# Patient Record
Sex: FEMALE | Race: OTHER | Employment: UNEMPLOYED | URBAN - NONMETROPOLITAN AREA
[De-identification: names, ages, dates, MRNs, and addresses within clinical notes are randomized per-mention and may not be internally consistent; named-entity substitution may affect disease eponyms.]

---

## 2017-03-06 ENCOUNTER — TELEPHONE (OUTPATIENT)
Dept: FAMILY MEDICINE CLINIC | Age: 52
End: 2017-03-06

## 2017-03-06 DIAGNOSIS — R42 VERTIGO: Primary | ICD-10-CM

## 2017-03-28 ENCOUNTER — TELEPHONE (OUTPATIENT)
Dept: FAMILY MEDICINE CLINIC | Age: 52
End: 2017-03-28

## 2017-03-30 ENCOUNTER — OFFICE VISIT (OUTPATIENT)
Dept: FAMILY MEDICINE CLINIC | Age: 52
End: 2017-03-30

## 2017-03-30 VITALS
HEART RATE: 81 BPM | TEMPERATURE: 97.7 F | SYSTOLIC BLOOD PRESSURE: 105 MMHG | WEIGHT: 112 LBS | DIASTOLIC BLOOD PRESSURE: 72 MMHG | BODY MASS INDEX: 22.58 KG/M2 | RESPIRATION RATE: 12 BRPM | HEIGHT: 59 IN

## 2017-03-30 DIAGNOSIS — N95.1 PERIMENOPAUSAL: ICD-10-CM

## 2017-03-30 DIAGNOSIS — Z00.00 WELL ADULT EXAM: ICD-10-CM

## 2017-03-30 DIAGNOSIS — Z12.31 SCREENING MAMMOGRAM, ENCOUNTER FOR: ICD-10-CM

## 2017-03-30 DIAGNOSIS — R79.89 LOW VITAMIN D LEVEL: ICD-10-CM

## 2017-03-30 DIAGNOSIS — R42 VERTIGO: ICD-10-CM

## 2017-03-30 DIAGNOSIS — E16.2 HYPOGLYCEMIA: Primary | ICD-10-CM

## 2017-03-30 DIAGNOSIS — N92.1 MENORRHAGIA WITH IRREGULAR CYCLE: ICD-10-CM

## 2017-03-30 LAB
ALBUMIN SERPL-MCNC: 3.9 GM/DL (ref 3.5–5.1)
ALP BLD-CCNC: 53 U/L (ref 38–126)
ALT SERPL-CCNC: 11 U/L (ref 11–66)
ANION GAP SERPL CALCULATED.3IONS-SCNC: 14 MEQ/L (ref 8–16)
ANISOCYTOSIS: ABNORMAL
AST SERPL-CCNC: 16 U/L (ref 5–40)
BASOPHILS # BLD: 2 %
BASOPHILS ABSOLUTE: 0.1 THOU/MM3 (ref 0–0.1)
BILIRUB SERPL-MCNC: 0.3 MG/DL (ref 0.3–1.2)
BILIRUBIN DIRECT: < 0.2 MG/DL (ref 0–0.3)
BUN BLDV-MCNC: 8 MG/DL (ref 7–22)
CALCIUM SERPL-MCNC: 8.7 MG/DL (ref 8.5–10.5)
CHLORIDE BLD-SCNC: 102 MEQ/L (ref 98–111)
CHOLESTEROL, TOTAL: 229 MG/DL (ref 100–199)
CO2: 25 MEQ/L (ref 23–33)
CREAT SERPL-MCNC: 0.8 MG/DL (ref 0.4–1.2)
EOSINOPHIL # BLD: 2.6 %
EOSINOPHILS ABSOLUTE: 0.1 THOU/MM3 (ref 0–0.4)
GFR SERPL CREATININE-BSD FRML MDRD: 76 ML/MIN/1.73M2
GLUCOSE BLD-MCNC: 86 MG/DL (ref 70–108)
HBA1C MFR BLD: 5.6 %
HCT VFR BLD CALC: 36.6 % (ref 37–47)
HDLC SERPL-MCNC: 60 MG/DL
HEMOGLOBIN: 12 GM/DL (ref 12–16)
LDL CHOLESTEROL CALCULATED: 142 MG/DL
LYMPHOCYTES # BLD: 30.1 %
LYMPHOCYTES ABSOLUTE: 1.5 THOU/MM3 (ref 1–4.8)
MAGNESIUM: 2.3 MG/DL (ref 1.6–2.4)
MCH RBC QN AUTO: 27 PG (ref 27–31)
MCHC RBC AUTO-ENTMCNC: 32.7 GM/DL (ref 33–37)
MCV RBC AUTO: 82.5 FL (ref 81–99)
MONOCYTES # BLD: 7 %
MONOCYTES ABSOLUTE: 0.4 THOU/MM3 (ref 0.4–1.3)
NUCLEATED RED BLOOD CELLS: 0 /100 WBC
PDW BLD-RTO: 16.4 % (ref 11.5–14.5)
PLATELET # BLD: 255 THOU/MM3 (ref 130–400)
PMV BLD AUTO: 8.2 MCM (ref 7.4–10.4)
POTASSIUM SERPL-SCNC: 3.3 MEQ/L (ref 3.5–5.2)
PROGESTERONE LEVEL: < 0.05 NG/ML
RBC # BLD: 4.44 MILL/MM3 (ref 4.2–5.4)
RBC # BLD: NORMAL 10*6/UL
SEG NEUTROPHILS: 58.3 %
SEGMENTED NEUTROPHILS ABSOLUTE COUNT: 2.9 THOU/MM3 (ref 1.8–7.7)
SODIUM BLD-SCNC: 141 MEQ/L (ref 135–145)
TOTAL PROTEIN: 7 GM/DL (ref 6.1–8)
TRIGL SERPL-MCNC: 135 MG/DL (ref 0–199)
TSH SERPL DL<=0.05 MIU/L-ACNC: 2.45 MCIU/ML (ref 0.4–4.2)
VITAMIN D 25-HYDROXY: 28 NG/ML (ref 30–100)
WBC # BLD: 5 THOU/MM3 (ref 4.8–10.8)

## 2017-03-30 PROCEDURE — 83036 HEMOGLOBIN GLYCOSYLATED A1C: CPT | Performed by: FAMILY MEDICINE

## 2017-03-30 PROCEDURE — 99214 OFFICE O/P EST MOD 30 MIN: CPT | Performed by: FAMILY MEDICINE

## 2017-03-30 ASSESSMENT — ENCOUNTER SYMPTOMS
DIARRHEA: 0
VOMITING: 0
BLOOD IN STOOL: 0
NAUSEA: 0
EYE PAIN: 0
COUGH: 0
SHORTNESS OF BREATH: 0
ABDOMINAL PAIN: 0
CONSTIPATION: 0
TROUBLE SWALLOWING: 0

## 2017-03-31 ENCOUNTER — TELEPHONE (OUTPATIENT)
Dept: FAMILY MEDICINE CLINIC | Age: 52
End: 2017-03-31

## 2017-03-31 LAB — THYROXINE (T4): 6.4 UG/DL (ref 4.5–12)

## 2017-04-01 LAB — DHEAS (DHEA SULFATE): 138 UG/DL (ref 32–240)

## 2017-04-02 LAB
ANA SCREEN: DETECTED
DHEA UNCONJUGATED: 1.61 NG/ML (ref 0.63–4.7)
TESTOSTERONE, FREE W SHGB, FEMALES/CHILDREN: NORMAL

## 2017-04-03 ENCOUNTER — TELEPHONE (OUTPATIENT)
Dept: FAMILY MEDICINE CLINIC | Age: 52
End: 2017-04-03

## 2017-04-03 LAB — ESTROGENS, FRACTIONATED, SERUM: NORMAL

## 2017-04-05 LAB — ANA SCREEN, REFLEXED: ABNORMAL

## 2017-04-07 ENCOUNTER — TELEPHONE (OUTPATIENT)
Dept: FAMILY MEDICINE CLINIC | Age: 52
End: 2017-04-07

## 2017-04-07 DIAGNOSIS — R42 VERTIGO: Primary | ICD-10-CM

## 2017-04-07 DIAGNOSIS — E16.2 HYPOGLYCEMIA: ICD-10-CM

## 2017-04-07 DIAGNOSIS — R76.8 POSITIVE ANA (ANTINUCLEAR ANTIBODY): ICD-10-CM

## 2017-04-07 DIAGNOSIS — R73.02 GLUCOSE INTOLERANCE (IMPAIRED GLUCOSE TOLERANCE): ICD-10-CM

## 2017-04-12 ENCOUNTER — TELEPHONE (OUTPATIENT)
Dept: FAMILY MEDICINE CLINIC | Age: 52
End: 2017-04-12

## 2017-06-22 ENCOUNTER — TELEPHONE (OUTPATIENT)
Dept: FAMILY MEDICINE CLINIC | Age: 52
End: 2017-06-22

## 2020-12-03 ENCOUNTER — TELEPHONE (OUTPATIENT)
Dept: FAMILY MEDICINE CLINIC | Age: 55
End: 2020-12-03

## 2020-12-03 NOTE — TELEPHONE ENCOUNTER
FYI:    Patient was last seen 08/15/2016 by Dr. Sirena Waite. She is scheduled for a new patient visit with Michelle Petersen on 12/17/2020. She was advised that she would be considered a new patient since it has been more than 3 years since she was last seen. She said that was fine and she doesn't have a preference of providers, whoever could see her soonest. However, she didn't want a physical/wellness exam, she just wanted to be seen for the issues she is having of wax build up and vertigo issues that she says she has been having for awhile now.

## 2020-12-09 ENCOUNTER — TELEPHONE (OUTPATIENT)
Dept: FAMILY MEDICINE CLINIC | Age: 55
End: 2020-12-09

## 2020-12-09 NOTE — TELEPHONE ENCOUNTER
1. Appt time and date. (give directions)      1320 12/17/20  2. Arrive 15 min before appt. 3. Please bring all medications to appt. 4. Bring immunization record. (if no record, which immunizations have you had and where?)    LM for patient to return call at their earliest convenience.

## 2020-12-17 ENCOUNTER — OFFICE VISIT (OUTPATIENT)
Dept: FAMILY MEDICINE CLINIC | Age: 55
End: 2020-12-17
Payer: COMMERCIAL

## 2020-12-17 VITALS
DIASTOLIC BLOOD PRESSURE: 76 MMHG | HEART RATE: 80 BPM | RESPIRATION RATE: 14 BRPM | BODY MASS INDEX: 23.18 KG/M2 | TEMPERATURE: 98.5 F | SYSTOLIC BLOOD PRESSURE: 108 MMHG | HEIGHT: 59 IN | WEIGHT: 115 LBS | OXYGEN SATURATION: 98 %

## 2020-12-17 PROBLEM — H90.41 SENSORINEURAL HEARING LOSS (SNHL) OF RIGHT EAR WITH UNRESTRICTED HEARING OF LEFT EAR: Status: ACTIVE | Noted: 2020-12-17

## 2020-12-17 PROBLEM — H81.10 VERTIGO, BENIGN PAROXYSMAL, UNSPECIFIED LATERALITY: Status: ACTIVE | Noted: 2020-12-17

## 2020-12-17 PROCEDURE — 99203 OFFICE O/P NEW LOW 30 MIN: CPT | Performed by: NURSE PRACTITIONER

## 2020-12-17 SDOH — ECONOMIC STABILITY: FOOD INSECURITY: WITHIN THE PAST 12 MONTHS, THE FOOD YOU BOUGHT JUST DIDN'T LAST AND YOU DIDN'T HAVE MONEY TO GET MORE.: NEVER TRUE

## 2020-12-17 SDOH — ECONOMIC STABILITY: TRANSPORTATION INSECURITY
IN THE PAST 12 MONTHS, HAS THE LACK OF TRANSPORTATION KEPT YOU FROM MEDICAL APPOINTMENTS OR FROM GETTING MEDICATIONS?: NO

## 2020-12-17 SDOH — ECONOMIC STABILITY: TRANSPORTATION INSECURITY
IN THE PAST 12 MONTHS, HAS LACK OF TRANSPORTATION KEPT YOU FROM MEETINGS, WORK, OR FROM GETTING THINGS NEEDED FOR DAILY LIVING?: NO

## 2020-12-17 SDOH — ECONOMIC STABILITY: FOOD INSECURITY: WITHIN THE PAST 12 MONTHS, YOU WORRIED THAT YOUR FOOD WOULD RUN OUT BEFORE YOU GOT MONEY TO BUY MORE.: NEVER TRUE

## 2020-12-17 SDOH — ECONOMIC STABILITY: INCOME INSECURITY: HOW HARD IS IT FOR YOU TO PAY FOR THE VERY BASICS LIKE FOOD, HOUSING, MEDICAL CARE, AND HEATING?: NOT HARD AT ALL

## 2020-12-17 ASSESSMENT — PATIENT HEALTH QUESTIONNAIRE - PHQ9
SUM OF ALL RESPONSES TO PHQ QUESTIONS 1-9: 0
SUM OF ALL RESPONSES TO PHQ QUESTIONS 1-9: 0
2. FEELING DOWN, DEPRESSED OR HOPELESS: 0
SUM OF ALL RESPONSES TO PHQ9 QUESTIONS 1 & 2: 0
SUM OF ALL RESPONSES TO PHQ QUESTIONS 1-9: 0
1. LITTLE INTEREST OR PLEASURE IN DOING THINGS: 0

## 2020-12-17 NOTE — PROGRESS NOTES
year hx, current smoker or quit within past 15 years) - n/a    Tetanus - needs  Influenza Vaccine - yearly  Pneumonia Vaccine - 65  Zostavax - 50   HPV Vaccine - n/a    Breast Cancer Screening - 50  Cervical Cancer Screening - needs  Osteoporosis Screening - 67  Chlamydia Screen - n/a    AAA Screening - n/a    Falls screening - n/a    No current outpatient medications on file. No current facility-administered medications for this visit. No orders of the defined types were placed in this encounter. All medications reviewed and reconciled, including OTC and herbal medications. Updated list given to patient.        Patient Active Problem List   Diagnosis    Vertigo    Hypoglycemia    Positive REHANA (antinuclear antibody)    Glucose intolerance (impaired glucose tolerance)       Past Medical History:   Diagnosis Date    Allergic rhinitis     seasonal    Labyrinthitis        Past Surgical History:   Procedure Laterality Date     SECTION      x's 2    TUBAL LIGATION         Allergies   Allergen Reactions    Sulfa Antibiotics Nausea And Vomiting       Social History     Socioeconomic History    Marital status:      Spouse name: Not on file    Number of children: Not on file    Years of education: Not on file    Highest education level: Not on file   Occupational History    Not on file   Social Needs    Financial resource strain: Not hard at all   [employer] insecurity     Worry: Never true     Inability: Never true   [employer] needs     Medical: No     Non-medical: No   Tobacco Use    Smoking status: Never Smoker    Smokeless tobacco: Never Used   Substance and Sexual Activity    Alcohol use: No    Drug use: No    Sexual activity: Not on file   Lifestyle    Physical activity     Days per week: Not on file     Minutes per session: Not on file    Stress: Not on file   Relationships    Social connections     Talks on phone: Not on file     Gets together: Not on file Attends [employer]ism service: Not on file     Active member of club or organization: Not on file     Attends meetings of clubs or organizations: Not on file     Relationship status: Not on file    Intimate partner violence     Fear of current or ex partner: Not on file     Emotionally abused: Not on file     Physically abused: Not on file     Forced sexual activity: Not on file   Other Topics Concern    Not on file   Social History Narrative    Not on file       Family History   Problem Relation Age of Onset    Other Mother         gastrointestinal    High Blood Pressure Father     High Cholesterol Father     Allergies Brother         food    Stroke Maternal Grandfather     Colon Cancer Neg Hx     Breast Cancer Neg Hx          I have reviewed the patient's past medical history, past surgical history, allergies, medications, social and family history and I have made updates where appropriate.       Review of Systems  Positive responses are highlighted in bold    Constitutional:  Fever, Chills, Night Sweats, Fatigue, Unexpected changes in weight  Eyes:  Eye discharge, Eye pain, Eye redness, Visual disturbances   HENT:  Ear pain, Tinnitus, Nosebleeds, Trouble swallowing, Hearing loss, Sore throat  Cardiovascular:  Chest Pain, Palpitations, Orthopnea, Paroxysmal Nocturnal Dyspnea  Respiratory:  Cough, Wheezing, Shortness of breath, Chest tightness, Apnea  Gastrointestinal:  Nausea, Vomiting, Diarrhea, Constipation, Heartburn, Blood in stool  Genitourinary:  Difficulty or painful urination, Flank pain, Change in frequency, Urgency  Skin:  Color change, Rash, Itching, Wound  Psychiatric:  Hallucinations, Anxiety, Depression, Suicidal ideation  Hematological:  Enlarged glands, Easy bleeding, Easily bruising  Musculoskeletal:  Joint pain, Back pain, Gait problems, Joint swelling, Myalgias  Neurological:  Dizziness, Headaches, Presyncope, Numbness, Seizures, Tremors  Allergy:  Environmental allergies, Food allergies  Endocrine:  Heat Intolerance, Cold Intolerance, Polydipsia, Polyphagia, Polyuria    Lab Results   Component Value Date     03/30/2017    K 3.3 (L) 03/30/2017     03/30/2017    CO2 25 03/30/2017    BUN 8 03/30/2017    CREATININE 0.8 03/30/2017    GLUCOSE 86 03/30/2017    CALCIUM 8.7 03/30/2017    PROT 7.0 03/30/2017    LABALBU 3.9 03/30/2017    BILITOT 0.3 03/30/2017    ALKPHOS 53 03/30/2017    AST 16 03/30/2017    ALT 11 03/30/2017    LABGLOM 76 (A) 03/30/2017     Lab Results   Component Value Date    WBC 5.0 03/30/2017    HGB 12.0 03/30/2017    HCT 36.6 (L) 03/30/2017    MCV 82.5 03/30/2017     03/30/2017     Lab Results   Component Value Date    TSH 2.450 03/30/2017       PHYSICAL EXAM:  Vitals:    12/17/20 1323   BP: 108/76   Pulse: 80   Resp: 14   Temp: 98.5 °F (36.9 °C)   SpO2: 98%   Weight: 115 lb (52.2 kg)   Height: 4' 11\" (1.499 m)     Body mass index is 23.23 kg/m². VS Reviewed  General Appearance: A&O x 3, No acute distress,well developed and well- nourished  Head: normocephalic and atraumatic  Eyes: pupils equal, round, and reactive to light, extraocular eye movements intact, conjunctivae and eye lids without erythema  ENT: external ear and ear canal clear bilaterally, TMs intact and regular, nose without deformity, nasal mucosa and turbinates normal without polyps, oropharynx normal, dentition is normal for age  Neck: supple and non-tender without mass, no thyromegaly or thyroid nodules, no cervical lymphadenopathy  Pulmonary/Chest: clear to auscultation bilaterally- no wheezes, rales or rhonchi, normal air movement, no respiratory distress or retractions  Cardiovascular: S1 and S2 auscultated w/ RRR. No murmurs, rubs, clicks, or gallops, distal pulses intact. Abdomen: soft, non-tender, non-distended, bowl sounds physiologic,  no rebound or guarding, no masses or hernias noted. Liver and spleen without enlargement.    Extremities: no cyanosis, clubbing or edema of the lower extremities. +2 PT/DP bilaterally. Musculoskeletal: No joint swelling or gross deformity   Neuro:  Alert, 5/5 strength globally and symmetrically, negative [place] hallpike bilaterally, chronic nystagmus  Psych: Affect appropriate. Mood normal. Thought process is normal without evidence of depression or psychosis. Good insight and appropriate interaction. Cognition and memory appear to be intact. Skin: warm and dry, no rash or erythema  Lymph:  No cervical, auricular or supraclavicular lymph nodes palpated    ASSESSMENT & PLAN  Radha was seen today for dizziness. Diagnoses and all orders for this visit:    Vertigo, benign paroxysmal, unspecified laterality  -     [facility]  -     Audiometry with tympanometry; Future  -     Videonystagmography; Future    Encounter to establish care    Sensorineural hearing loss (SNHL) of right ear with unrestricted hearing of left ear  -     [facility]  -     Audiometry with tympanometry; Future  -     Videonystagmography; Future      - refer to audiology for audiometry and VNG  - consider ENT referral and/or MRI brain depending upon results  - declines colonoscopy and mammogram screenings    DISPOSITION    Return if symptoms worsen or fail to improve. Radha released without restrictions. PATIENT COUNSELING    Counseling was provided today regarding the following topics: Healthy eating habits, Regular exercise, substance abuse and healthy sleep habits. Radha received counseling on the following healthy behaviors: exercise    Patient given educational materials on: See Attached    I have instructed Radha to complete a self tracking handout on none and instructed them to bring it with them to her next appointment. Barriers to learning and self management: none    Discussed use, benefit, and side effects of prescribed medications. Barriers to medication compliance addressed. All patient questions answered.   Pt voiced understanding.        Electronically signed by JUAN eVrma CNP on 12/17/2020 at 2:06 PM

## 2021-01-26 ENCOUNTER — HOSPITAL ENCOUNTER (OUTPATIENT)
Dept: AUDIOLOGY | Age: 56
Discharge: HOME | End: 2021-01-26
Payer: COMMERCIAL

## 2021-01-26 PROCEDURE — 92537 CALORIC VSTBLR TEST W/REC: CPT | Performed by: AUDIOLOGIST

## 2021-01-26 PROCEDURE — 92540 BASIC VESTIBULAR EVALUATION: CPT | Performed by: AUDIOLOGIST

## 2021-01-26 PROCEDURE — 92557 COMPREHENSIVE HEARING TEST: CPT | Performed by: AUDIOLOGIST

## 2021-01-26 PROCEDURE — 92567 TYMPANOMETRY: CPT | Performed by: AUDIOLOGIST

## 2021-01-27 NOTE — PROGRESS NOTES
ACCOUNT #: [de-identified]                                    AUDIOLOGICAL EVALUATION WITH VNG      MEDICATIONS REVIEWED:  Patient has held appropriate medications for VNG testing. REASON FOR TESTING: Audiometry, tympanometry and VNG testing, per the request of JUAN Fabian, due to the diagnosis of benign paroxysmal vertigo (unspecified laterality) and sensorineural hearing loss of the right ear. According to the patient, VNG testing was also recommended by Dr. Angie Qureshi in [place]. The patient explains that she first started to have issues with vertigo in 2000 after her twins were born. She previously had attacks of vertigo that lasted 2-3 days. This would happen once or twice a year. She has noticed gradual hearing loss in her right ear. She has not had an audiogram or formal vestibular testing at this point. She presently feels vertigo when she lays on her right side and when looking up and bending over. Mrs. Dewitt explains that she has been able to manage the vertigo by moving slowly and avoiding the movements that trigger the vertigo. She received physical therapy in the past and was treated for BPPV. She attempts the Epley maneuver at home, but continues to have vertigo. She denies any tinnitus, otalgia and aural pressure. Prior to beginning the VNG, the patient explained that she has congenital nystagmus and disconjugate eye movements. OTOSCOPY: WNL for both ears. AUDIOGRAM        Reliability: Good  Audiometer Used:  [id number]      VNG RESULTS: The patient's eyes were recorded individually due to disconjugate eye movement and congenital nystagmus. All VNG tracings are scanned into media tab. SACCADES: Abnormal latency for both eyes with abnormal accuracy for the right eye. TRACKING: Abnormal.  OPTOKINETICS: Could not test.  GAZE: Left beating congenital nystagmus was recorded for the following directions:   -Gaze left 47.1 deg/second   -Gaze center 35.8 deg/second.  The nystagmus decreased to 10.4 deg/second with vision denied.   -Gaze up 7.2 deg/second   -Gaze down 22.9 deg/second  HALLPIKE MANEUVER:  The left beating nystagmus congenital nystagmus was recorded at 12.5 deg/sec. Rotary nystagmus was observed for a few seconds for the Hallpike for the right side, which suggests Benign Paroxysmal Positional Vertigo (BPPV). The patient was also symptomatic for the right side. POSITIONAL: Left beating congenital nystagmus of 17.4 deg/sec with head supine and 9.8 deg/second for the head left position. HEADSHAKE TEST: Left beating congenital nystagmus of 8 deg/sec did not enhance following the HFHS test.  CALORIC TESTING: Caloric results are difficult to interpret due to the congenital nystagmus. With the congenital nystagmus factored out, there was a reduced vestibular response of approximately 14% for the right ear, which is not considered a significant caloric weakness. VERONICA' SOT: Sway for the Rhomberg and Sharpened Rhomberg with vision denied. COMMENTS: Audiometry revealed normal hearing sensitivity for the left ear and normal hearing sensitivity, sloping to severe sensorineural hearing loss for the right ear. Speech discrimination ability is excellent at 100% for the left ear and good at 80% for the right ear. Tympanometry revealed normal peak pressure and normal middle ear compliance for both ears. VNG results revealed ocularmotor abnormalities, which is consistent with congenital nystagmus. The positive Hallpike maneuver for the right side suggests a peripheral vestibular pathology; specifically Benign Paroxysmal Positional Vertigo. RECOMMENDATION(S):   1- Today's results were discussed with the patient. It is recommended that she follow up with Dr. Jenn Mcallister for further evaluation and management. 2- Further testing, such as an MRI of the internal auditory canals, is recommended to rule out a retrocochlear lesion in the the unilateral hearing loss affecting the right ear.   3-

## 2021-02-01 ENCOUNTER — TELEPHONE (OUTPATIENT)
Dept: FAMILY MEDICINE CLINIC | Age: 56
End: 2021-02-01

## 2021-02-01 NOTE — TELEPHONE ENCOUNTER
Pt informed and verbalized understanding. Pt doesn't want to do the MRI @ this time. She will call Dr Jourdan Holt and schedule a f/u appt, she would like to stay with him.

## 2021-02-01 NOTE — TELEPHONE ENCOUNTER
Let Jacinto Childs know I did get the report from the audiologist regarding her hearing test and vestibular testing. It is showing that the dizziness is definitely coming from the periphery, it is consistent with benign positional vertigo. It also showed congenital nystagmus which we were aware of. Her hearing test did show some significant hearing loss in the right ear, and because it is unilateral, the audiologist did recommend doing an MRI of her brain to visualize the intra-auditory canals, to make sure there were no brain lesions causing the unilateral hearing loss. Is this ok with her? The audiologist did also recommend she follow up with Dr Jae Syed, which I believe is the ENT she saw in [place]. Would she like referral to local ENT, or is she ok to go back to [place]? Please advise.

## 2022-12-29 ENCOUNTER — OFFICE VISIT (OUTPATIENT)
Dept: PRIMARY CARE CLINIC | Age: 57
End: 2022-12-29
Payer: COMMERCIAL

## 2022-12-29 VITALS
BODY MASS INDEX: 21.6 KG/M2 | WEIGHT: 110 LBS | TEMPERATURE: 98.6 F | HEART RATE: 74 BPM | DIASTOLIC BLOOD PRESSURE: 87 MMHG | OXYGEN SATURATION: 98 % | SYSTOLIC BLOOD PRESSURE: 126 MMHG | HEIGHT: 60 IN

## 2022-12-29 DIAGNOSIS — U07.1 COVID-19: ICD-10-CM

## 2022-12-29 DIAGNOSIS — B96.89 ACUTE BACTERIAL SINUSITIS: Primary | ICD-10-CM

## 2022-12-29 DIAGNOSIS — R42 VERTIGO: ICD-10-CM

## 2022-12-29 DIAGNOSIS — J01.90 ACUTE BACTERIAL SINUSITIS: Primary | ICD-10-CM

## 2022-12-29 PROCEDURE — 99213 OFFICE O/P EST LOW 20 MIN: CPT | Performed by: NURSE PRACTITIONER

## 2022-12-29 RX ORDER — PREDNISONE 20 MG/1
40 TABLET ORAL DAILY
Qty: 10 TABLET | Refills: 0 | Status: CANCELLED | OUTPATIENT
Start: 2022-12-29 | End: 2023-01-03

## 2022-12-29 RX ORDER — PREDNISONE 20 MG/1
40 TABLET ORAL DAILY
Qty: 10 TABLET | Refills: 0 | Status: SHIPPED | OUTPATIENT
Start: 2022-12-29 | End: 2023-01-03

## 2022-12-29 RX ORDER — MECLIZINE HYDROCHLORIDE 25 MG/1
25 TABLET ORAL 3 TIMES DAILY PRN
Qty: 15 TABLET | Refills: 0 | Status: CANCELLED | OUTPATIENT
Start: 2022-12-29 | End: 2023-01-08

## 2022-12-29 RX ORDER — DOXYCYCLINE HYCLATE 100 MG/1
100 CAPSULE ORAL 2 TIMES DAILY
Qty: 14 CAPSULE | Refills: 0 | Status: SHIPPED | OUTPATIENT
Start: 2022-12-29 | End: 2023-01-05

## 2022-12-29 ASSESSMENT — ENCOUNTER SYMPTOMS
CONSTIPATION: 0
COUGH: 1
EYE DISCHARGE: 0
VOMITING: 1
SHORTNESS OF BREATH: 0
NAUSEA: 0
RECTAL PAIN: 0
ABDOMINAL PAIN: 1
CHEST TIGHTNESS: 0
SORE THROAT: 0
DIARRHEA: 0
VOICE CHANGE: 0
SINUS PRESSURE: 0
EYE REDNESS: 0
WHEEZING: 0

## 2022-12-29 NOTE — PROGRESS NOTES
[address] [facility]  [address] [address]  Dept: [phone]  Dept Fax: [phone]     Gifty Hayes is a 56 y.o. female who presents to the urgent care today for her medicalconditions/complaints as noted below. Gifty Hayes is c/o of Post-COVID Symptoms (Day 10 of COVID symptoms- head congestion, stomach pain, back pain/Symptoms are flaring up vertigo; dizziness and vomiting  )    HPI:      Sinusitis  This is a new problem. Episode onset: 10 days ago. The problem has been gradually worsening since onset. Associated symptoms include congestion, coughing, ear pain (fullness) and headaches. Pertinent negatives include no chills, shortness of breath, sinus pressure, sneezing or sore throat. Treatments tried: otc tx. The treatment provided no relief. Tested positive for COVID. Symptoms initially started 10 days ago. She feels that her initial COVID symptoms have been lingering on including the congestion, cough, and body aches. She states that in addition to this, she has also noticed that her vertigo is flared up. She has been noticing dizziness and as a result of the dizziness, vomiting. In addition to this, her back is now bothering him due to the coughing/body aches. Past Medical History:   Diagnosis Date    Allergic rhinitis     seasonal    Labyrinthitis       Current Outpatient Medications   Medication Sig Dispense Refill    predniSONE (DELTASONE) 20 MG tablet Take 2 tablets by mouth daily for 5 days 10 tablet 0    doxycycline hyclate (VIBRAMYCIN) 100 MG capsule Take 1 capsule by mouth 2 times daily for 7 days 14 capsule 0     No current facility-administered medications for this visit. Allergies   Allergen Reactions    Sulfa Antibiotics Nausea And Vomiting     Reviewed PMH, SH, and FH with the patient and updated. Subjective:      Review of Systems   Constitutional:  Positive for fatigue.  Negative for chills and fever. HENT:  Positive for congestion, ear pain (fullness) and postnasal drip. Negative for ear discharge, sinus pressure, sneezing, sore throat and voice change. Eyes:  Negative for discharge and redness. Respiratory:  Positive for cough. Negative for chest tightness, shortness of breath and wheezing. Cardiovascular: Negative. Negative for chest pain. Gastrointestinal:  Positive for abdominal pain and vomiting. Negative for constipation, diarrhea, nausea and rectal pain. Musculoskeletal:  Negative for myalgias. Skin:  Negative for rash. Neurological:  Positive for dizziness and headaches. Negative for weakness and light-headedness. Hematological:  Negative for adenopathy. All other systems reviewed and are negative. Objective:      Physical Exam  Vitals and nursing note reviewed. Constitutional:       General: She is not in acute distress. Appearance: Normal appearance. She is well-developed. She is not ill-appearing, toxic-appearing or diaphoretic. HENT:      Head: Normocephalic. Right Ear: External ear normal. A middle ear effusion is present. Left Ear: External ear normal. A middle ear effusion is present. Nose: Nose normal.      Right Sinus: No maxillary sinus tenderness or frontal sinus tenderness. Left Sinus: No maxillary sinus tenderness or frontal sinus tenderness. Mouth/Throat:      Pharynx: Oropharyngeal exudate (PND) and posterior oropharyngeal erythema (mild) present. Eyes:      General:         Right eye: No discharge. Left eye: No discharge. Cardiovascular:      Rate and Rhythm: Normal rate and regular rhythm. Heart sounds: Normal heart sounds. No murmur heard. Pulmonary:      Effort: Pulmonary effort is normal. No respiratory distress. Breath sounds: Normal breath sounds. No wheezing or rales. Lymphadenopathy:      Cervical: No cervical adenopathy. Skin:     General: Skin is warm. Findings: No rash. Neurological:      Mental Status: She is alert. /87   Pulse 74   Temp 98.6 °F (37 °C) (Tympanic)   Ht 5' (1.524 m)   Wt 110 lb (49.9 kg)   SpO2 98%   BMI 21.48 kg/m²     Assessment:       Diagnosis Orders   1. Acute bacterial sinusitis  doxycycline hyclate (VIBRAMYCIN) 100 MG capsule      2. COVID-19  predniSONE (DELTASONE) 20 MG tablet      3. Vertigo  predniSONE (DELTASONE) 20 MG tablet        Plan:      Based on the duration and severity of the symptoms-- I will treat this as bacterial at this time. Complete antibiotic prescription fully. Prednisone sent to the pharmacy to help enable symptom relief. Patient declined meclizine/Zofran at this time, due to it worsening her symptoms in the past.   May use Tylenol for fever/pain. Patient agreeable to treatment plan. Educational materials provided on AVS.  Follow up or call the office if symptoms do not improve/worsen. Orders Placed This Encounter   Medications    predniSONE (DELTASONE) 20 MG tablet     Sig: Take 2 tablets by mouth daily for 5 days     Dispense:  10 tablet     Refill:  0    doxycycline hyclate (VIBRAMYCIN) 100 MG capsule     Sig: Take 1 capsule by mouth 2 times daily for 7 days     Dispense:  14 capsule     Refill:  0        Patient given educational materials - see patient instructions. Discussed use, benefit, and side effects of prescribed medications. All patientquestions answered. Pt voiced understanding.     Electronically signed by JUAN Haley CNP on 12/29/2022at 2:14 PM

## 2023-01-03 ENCOUNTER — TELEPHONE (OUTPATIENT)
Dept: PRIMARY CARE CLINIC | Age: 58
End: 2023-01-03

## 2023-01-03 DIAGNOSIS — M79.10 COVID-19 LONG HAULER MANIFESTING CHRONIC MUSCLE PAIN: Primary | ICD-10-CM

## 2023-01-03 DIAGNOSIS — R42 VERTIGO: ICD-10-CM

## 2023-01-03 DIAGNOSIS — U09.9 COVID-19 LONG HAULER MANIFESTING CHRONIC MUSCLE PAIN: ICD-10-CM

## 2023-01-03 DIAGNOSIS — G89.29 COVID-19 LONG HAULER MANIFESTING CHRONIC MUSCLE PAIN: ICD-10-CM

## 2023-01-03 DIAGNOSIS — G89.29 COVID-19 LONG HAULER MANIFESTING CHRONIC MUSCLE PAIN: Primary | ICD-10-CM

## 2023-01-03 DIAGNOSIS — U09.9 COVID-19 LONG HAULER: ICD-10-CM

## 2023-01-03 DIAGNOSIS — U09.9 COVID-19 LONG HAULER MANIFESTING CHRONIC MUSCLE PAIN: Primary | ICD-10-CM

## 2023-01-03 DIAGNOSIS — M79.10 COVID-19 LONG HAULER MANIFESTING CHRONIC MUSCLE PAIN: ICD-10-CM

## 2023-01-03 RX ORDER — AZITHROMYCIN 250 MG/1
TABLET, FILM COATED ORAL
Qty: 1 PACKET | Refills: 0 | Status: SHIPPED | OUTPATIENT
Start: 2023-01-03

## 2023-01-03 RX ORDER — NAPROXEN 500 MG/1
500 TABLET ORAL 2 TIMES DAILY WITH MEALS
Qty: 28 TABLET | Refills: 0 | Status: SHIPPED | OUTPATIENT
Start: 2023-01-03 | End: 2023-01-17

## 2023-01-03 RX ORDER — FLUTICASONE PROPIONATE 50 MCG
2 SPRAY, SUSPENSION (ML) NASAL DAILY
Qty: 32 G | Refills: 0 | Status: SHIPPED | OUTPATIENT
Start: 2023-01-03

## 2023-01-03 RX ORDER — NAPROXEN 500 MG/1
500 TABLET ORAL 2 TIMES DAILY WITH MEALS
Qty: 28 TABLET | Refills: 0 | Status: SHIPPED | OUTPATIENT
Start: 2023-01-03 | End: 2023-01-03 | Stop reason: SDUPTHER

## 2023-01-03 RX ORDER — FLUTICASONE PROPIONATE 50 MCG
2 SPRAY, SUSPENSION (ML) NASAL DAILY
Qty: 32 G | Refills: 0 | Status: SHIPPED | OUTPATIENT
Start: 2023-01-03 | End: 2023-01-03 | Stop reason: SDUPTHER

## 2023-01-03 RX ORDER — AZITHROMYCIN 250 MG/1
TABLET, FILM COATED ORAL
Qty: 1 PACKET | Refills: 0 | Status: SHIPPED | OUTPATIENT
Start: 2023-01-03 | End: 2023-01-03 | Stop reason: SDUPTHER

## 2023-01-03 NOTE — TELEPHONE ENCOUNTER
Patient called stating that she is still not improving. She did stop taking the prednisone because she was experiencing SOB and elevated heart rate after taking it. She is concerned that she is having some sort of reaction to the antibiotic she is on. She states after she takes it the body aches worsen and feels even worse. She would like to know if she should continue with that antibiotic or what you would recommend?

## 2023-01-03 NOTE — TELEPHONE ENCOUNTER
I agree, we can stop both the antibiotic and the steroid at this time. I did some additional research on her symptoms and sometimes these symptoms can linger for quite some time after having COVID. At this time, I would recommend we trial Naproxen twice daily with food for the body aches part of things. I know she reacted poorly to the prednisone, so I would recommend that we try a nasal steroid to help with the vertigo part of things. We can also trial azithromycin as an antibiotic course, which has been helpful for some of my patients dealing with similar COVID symptoms. Keep us updated if symptoms worsen or do not improve. If no improvement, we may want to consider getting some lab work done to rule out anything underlying.

## 2023-03-24 ENCOUNTER — OFFICE VISIT (OUTPATIENT)
Dept: DERMATOLOGY | Age: 58
End: 2023-03-24
Payer: COMMERCIAL

## 2023-03-24 VITALS
OXYGEN SATURATION: 99 % | WEIGHT: 110 LBS | HEIGHT: 60 IN | SYSTOLIC BLOOD PRESSURE: 139 MMHG | DIASTOLIC BLOOD PRESSURE: 95 MMHG | BODY MASS INDEX: 21.6 KG/M2 | HEART RATE: 77 BPM

## 2023-03-24 DIAGNOSIS — L28.0 LICHEN SIMPLEX CHRONICUS: Primary | ICD-10-CM

## 2023-03-24 PROCEDURE — 99204 OFFICE O/P NEW MOD 45 MIN: CPT | Performed by: PHYSICIAN ASSISTANT

## 2023-03-24 RX ORDER — CLOBETASOL PROPIONATE 0.5 MG/G
CREAM TOPICAL
Qty: 60 G | Refills: 3 | Status: SHIPPED | OUTPATIENT
Start: 2023-03-24

## 2023-03-24 NOTE — PROGRESS NOTES
day 1 followed by 1 tab on days 2-5. (Patient not taking: Reported on 3/24/2023) 1 packet 0     No current facility-administered medications for this visit. ALLERGIES:   Allergies   Allergen Reactions    Sulfa Antibiotics Nausea And Vomiting       SOCIAL HISTORY:  Social History     Tobacco Use    Smoking status: Never    Smokeless tobacco: Never   Substance Use Topics    Alcohol use: No       Pertinent ROS:  Review of Systems  Skin: Denies any new changing, growing or bleeding lesions or rashes except as described in the HPI   Constitutional: Denies fevers, chills, and malaise. PHYSICAL EXAM:   BP (!) 139/95   Pulse 77   Ht 5' (1.524 m)   Wt 110 lb (49.9 kg)   SpO2 99%   BMI 21.48 kg/m²     The patient is generally well appearing, well nourished, alert and conversational. Affect is normal.    Cutaneous Exam:  Physical Exam  Sun-exposed skin: head/face, neck, both arms, digits and nails were examined. Lichenification, hyperpigmentation on bilateral forearms. Pt was actively rubbing forearms throughout visit. Facial covering was not removed during examination. Diagnoses/exam findings/medical history pertinent to this visit are listed below:    Assessment:   Diagnosis Orders   1. Lichen simplex chronicus  clobetasol (TEMOVATE) 0.05 % cream           Plan:  1. Lichen simplex chronicus  - chronic illness with progression and/or exacerbation   - clobetasol (TEMOVATE) 0.05 % cream; Apply topically 2 times daily, as needed, for itching. Do NOT use on face, groin, armpits, or under breasts. Dispense: 60 g; Refill: 3  - I am suspicious of brachioradial pruritus, secondary to mild impingement/arthritis in c-spine. Imaging of neck may be appropriate, should clobetasol have no effect on her pruritus.       RTC 4W    Future Appointments   Date Time Provider Esvin Jeffrey   4/28/2023  9:00 AM Damon Newsome PA-C  [facility]         Patient Instructions   - Discussed dry skin care with patient, which

## 2023-03-24 NOTE — PATIENT INSTRUCTIONS
- Discussed dry skin care with patient, which includes the following:  A)  Warm, short showers  B)  No scrubbing devices in shower  C)  Dove Sensitive Skin soap  D)  CeraVe cream immediately after showering, while skin is still damp    Sun Protection     There are two types of sun rays that are harmful to the skin. UVA rays cause skin aging and skin cancer, such as melanoma. UVB rays cause sunburns, cataracts, and also contribute to skin cancer. The American-Academy of Dermatology recommends that children and adults wear a broad spectrum, waterproof sunscreen with a Sun Protection Factor (SPF) of 30 or higher. It is important to check the ingredient label to be sure the sunscreen will protect the skin from both UVA and UVB sunrays. Your sunscreen should contain at least one of the following ingredients: titanium dioxide, zinc oxide, or avobenzone. Sunscreen will not be effective unless it is applied to all exposed skin. Sunscreens work best if they are applied 30 minutes before sun exposure. They should be reapplied every 2 hours and after any water exposure. Sunscreen is not perfect. It is important to use other methods to protect the skin from sun exposure also. Wear hats, sunglasses and other sun protective clothing when outdoors.   Stay in the shade during the peak hours of sun exposure between 10 AM and 4 PM